# Patient Record
Sex: MALE
[De-identification: names, ages, dates, MRNs, and addresses within clinical notes are randomized per-mention and may not be internally consistent; named-entity substitution may affect disease eponyms.]

---

## 2024-08-29 PROBLEM — Z00.00 ENCOUNTER FOR PREVENTIVE HEALTH EXAMINATION: Status: ACTIVE | Noted: 2024-08-29

## 2024-09-26 ENCOUNTER — APPOINTMENT (OUTPATIENT)
Dept: ORTHOPEDIC SURGERY | Facility: CLINIC | Age: 44
End: 2024-09-26
Payer: COMMERCIAL

## 2024-09-26 VITALS — WEIGHT: 202 LBS | HEIGHT: 74 IN | BODY MASS INDEX: 25.93 KG/M2

## 2024-09-26 DIAGNOSIS — Z78.9 OTHER SPECIFIED HEALTH STATUS: ICD-10-CM

## 2024-09-26 DIAGNOSIS — M25.512 PAIN IN LEFT SHOULDER: ICD-10-CM

## 2024-09-26 PROCEDURE — 73030 X-RAY EXAM OF SHOULDER: CPT | Mod: LT

## 2024-09-26 PROCEDURE — 99204 OFFICE O/P NEW MOD 45 MIN: CPT | Mod: 25

## 2024-09-26 PROCEDURE — 73010 X-RAY EXAM OF SHOULDER BLADE: CPT | Mod: LT

## 2024-09-27 NOTE — HISTORY OF PRESENT ILLNESS
[de-identified] : The patient is a 43 year  old R hand dominant male who presents today complaining of L shoulder pain.   Date of Injury/Onset: 7/15/24 Pain:    At Rest: 8/10  With Activity:  9/10  Mechanism of injury: gradual onset, no specific MIGUELINA This is NOT a Work Related Injury being treated under Worker's Compensation. This is NOT an athletic injury occurring associated with an interscholastic or organized sports team. Quality of symptoms: sharp pain Improves with: rest Worse with: shoulder abduction, OH motions Prior treatment: [facility], Ernesto GARCIA Prior Imaging: MRI @ [facility] Out of work/sport:  currently working School/Sport/Position/Occupation: manager  Additional Information: None

## 2024-09-27 NOTE — IMAGING
[de-identified] : The patient is a well appearing 43 year  old male of their stated age. Neck is supple & nontender to palpation. Negative Spurling's test.   Effected Shoulder: LEFT Inspection: Scapula Winging: Negative Deformity: None Erythema: None Ecchymosis: None Abrasions: None Effusion: None   Range of Motion: Active Forward Flexion: 170 degrees Active Abduction: 170 degrees Passive Forward Flexion: 170 degrees Passive Abduction: 170 degrees ER @ 90 degrees: 90 degrees IR @ 90 degrees: 30 degrees ER @ 0 degrees: 30 degrees   Motor Exam: Forward Flexion: 4+ out of 5 Flexion Plane of Scapula: 4+ out of 5 Abduction: 4- out of 5 Internal Rotation: 5- out of 5 External Rotation: 4+ out of 5 Distal Motor Strength: 5 out of 5   Stability Testing: Anterior: 1+ Posterior: 1+ Sulcus N: 1+ Sulcus ER: 1+ Provocative Tests: Drop Arm: Negative Impingement: POSITIVE  [place]: Negative X-Arm Adduction: Negative Belly Press: Negative Bear Hug: Negative Lift Off: Negative Apprehension: Negative Relocation: Negative Posterior Load & Shift: Negative   Palpation: AC Joint: Nontender Clavicle: Nontender SC Joint: Nontender BicIpital Groove: Nontender Coracoid Process: Nontender Pectoralis Minor Tendon: Nontender Pectoralis Major Tendon: Nontender & palpably intact Latissimus Dorsi: Nontender Proximal Humerus: Nontender Scapula Body: Nontender Medial Scapula Boarder: Nontender Scapula Spine: Nontender   Neurologic Exam: Sensation to Light Touch: Axillary: Grossly intact Ulnar: Grossly intact Radial: Grossly intact Median: Grossly intact Other:  N/A Circulatory/Pulses: Ulnar: 2+ Radial: 2+ Other Pertinent Findings: None     Contralateral Shoulder: Range of Motion: Active Forward Flexion: 180 degrees Active Abduction: 180 degrees Passive Forward Flexion: 180 degrees Passive Abduction: 180 degrees ER @ 90 degrees: 90 degrees IR @ 90 degrees: 45 degrees ER @ 0 degrees: 50 degrees Motor Exam: Forward Flexion: 5 out of 5 Flexion Plane of Scapula: 5 out of 5 Abduction: 5 out of 5 Internal Rotation: 5 out of 5 External Rotation: 5 out of 5 Distal Motor Strength: 5 out of 5 Stability Testing: Anterior: 1+ Posterior: 1+ Sulcus N: 1+ Sulcus ER: 1+ Other Pertinent Findings: None   Assessment: The patient is a 43-year-old male with Left shoulder pain and radiographic and physical exam findings consistent with glenohumeral OA and possible status of rotator cuff tear The patient's condition is acute. Documents/Results Reviewed Today: MRI left shoulder from ([facility] 2022) X-Ray left shoulder/scapula Tests/Studies Independently Interpreted Today: MRI left shoulder from ([facility] 2022) reveals evidence of glenohumeral OA and rotator cuff tendinopathy X-Ray left shoulder/scapula reveals evidence of glenohumeral OA with beard spur Pertinent findings include: 170/170/90/30/30, + impingement, 4-/5 ABD, 4+/5 FF, FSP, ER, 5-/5 IR,  Confounding medical conditions/concerns: None   Plan: Due to worsening pain and instability with mechanical symptoms, patient will obtain MRI Left shoulder to evaluate for status of rotator cuff tear. In the interim, we reviewed appropriate use of OTC anti-inflammatories as needed for pain, inflammation, and discomfort. Modify activity as discussed. Tests Ordered: MRI Left shoulder Prescription Medications Ordered: Discussed appropriate use of OTC anti-inflammatories and analgesics (including but not limited to Aleve, Advil, Tylenol, Motrin, Ibuprofen, Voltaren gel, etc.) Braces/DME Ordered: None Activity/Work/Sports Status: None Additional Instructions: None Follow-Up: s/p MRI   The patient's current medication management of their orthopedic diagnosis was reviewed today: (1) We discussed a comprehensive treatment plan that included possible pharmaceutical management involving the use of prescription strength medications including but not limited to options such as oral Naprosyn 500mg BID, once daily Meloxicam 15 mg, or 500-650 mg Tylenol versus over the counter oral medications and topical prescription NSAID Pennsaid vs over the counter Voltaren gel.  Based on our extensive discussion, the patient declined prescription medication and will use over the counter Advil, Alleve, Voltaren Gel or Tylenol as directed. (2) There is a moderate risk of morbidity with further treatment, especially from use of prescription strength medications and possible side effects of these medications which include upset stomach with oral medications, skin reactions to topical medications and cardiac/renal issues with long term use. (3) I recommended that the patient follow-up with their medical physician to discuss any significant specific potential issues with long term medication use such as interactions with current medications or with exacerbation of underlying medical comorbidities. (4) The benefits and risks associated with use of injectable, oral or topical, prescription and over the counter anti-inflammatory medications were discussed with the patient. The patient voiced understanding of the risks including but not limited to bleeding, stroke, kidney dysfunction, heart disease, and were referred to the black box warning label for further information.  ICarolin attest that this documentation has been prepared under the direction and in the presence of Provider Dr. Mark Johnson.  The documentation recorded by the scribe accurately reflects the services Dr. Mark VICKERS, personally performed and the decisions made by me. [FreeTextEntry1] : X-Ray left shoulder/scapula reveals evidence of glenohumeral OA with inferior beard spur

## 2024-10-11 ENCOUNTER — RESULT REVIEW (OUTPATIENT)
Age: 44
End: 2024-10-11

## 2024-12-05 ENCOUNTER — APPOINTMENT (OUTPATIENT)
Dept: ORTHOPEDIC SURGERY | Facility: CLINIC | Age: 44
End: 2024-12-05
Payer: COMMERCIAL

## 2024-12-05 VITALS — BODY MASS INDEX: 25.93 KG/M2 | HEIGHT: 74 IN | WEIGHT: 202 LBS

## 2024-12-05 DIAGNOSIS — M19.012 PRIMARY OSTEOARTHRITIS, LEFT SHOULDER: ICD-10-CM

## 2024-12-05 DIAGNOSIS — M25.512 PAIN IN LEFT SHOULDER: ICD-10-CM

## 2024-12-05 DIAGNOSIS — M75.82 OTHER SHOULDER LESIONS, LEFT SHOULDER: ICD-10-CM

## 2024-12-05 PROCEDURE — J3490M: CUSTOM

## 2024-12-05 PROCEDURE — 20610 DRAIN/INJ JOINT/BURSA W/O US: CPT | Mod: LT

## 2024-12-05 PROCEDURE — 99214 OFFICE O/P EST MOD 30 MIN: CPT | Mod: 25

## 2024-12-05 RX ORDER — NAPROXEN 500 MG/1
500 TABLET ORAL
Qty: 60 | Refills: 0 | Status: ACTIVE | COMMUNITY
Start: 2024-12-05 | End: 1900-01-01

## 2024-12-06 PROBLEM — M75.82 TENDINITIS OF LEFT ROTATOR CUFF: Status: ACTIVE | Noted: 2024-12-05

## 2024-12-06 PROBLEM — M19.012 ARTHRITIS OF LEFT ACROMIOCLAVICULAR JOINT: Status: ACTIVE | Noted: 2024-12-05

## 2024-12-06 PROBLEM — M19.012 ARTHRITIS OF LEFT GLENOHUMERAL JOINT: Status: ACTIVE | Noted: 2024-12-05

## 2025-01-30 ENCOUNTER — APPOINTMENT (OUTPATIENT)
Dept: ORTHOPEDIC SURGERY | Facility: CLINIC | Age: 45
End: 2025-01-30

## 2025-05-08 ENCOUNTER — APPOINTMENT (OUTPATIENT)
Dept: ORTHOPEDIC SURGERY | Facility: CLINIC | Age: 45
End: 2025-05-08
Payer: COMMERCIAL

## 2025-05-08 VITALS — WEIGHT: 202 LBS | HEIGHT: 74 IN | BODY MASS INDEX: 25.93 KG/M2

## 2025-05-08 DIAGNOSIS — M19.012 PRIMARY OSTEOARTHRITIS, LEFT SHOULDER: ICD-10-CM

## 2025-05-08 DIAGNOSIS — M75.82 OTHER SHOULDER LESIONS, LEFT SHOULDER: ICD-10-CM

## 2025-05-08 PROCEDURE — 20610 DRAIN/INJ JOINT/BURSA W/O US: CPT | Mod: LT

## 2025-05-08 PROCEDURE — J3490M: CUSTOM

## 2025-05-08 PROCEDURE — 73010 X-RAY EXAM OF SHOULDER BLADE: CPT | Mod: LT

## 2025-05-08 PROCEDURE — 73030 X-RAY EXAM OF SHOULDER: CPT | Mod: LT

## 2025-05-08 PROCEDURE — 99214 OFFICE O/P EST MOD 30 MIN: CPT | Mod: 25
